# Patient Record
Sex: FEMALE | Race: BLACK OR AFRICAN AMERICAN | NOT HISPANIC OR LATINO | URBAN - METROPOLITAN AREA
[De-identification: names, ages, dates, MRNs, and addresses within clinical notes are randomized per-mention and may not be internally consistent; named-entity substitution may affect disease eponyms.]

---

## 2017-12-11 ENCOUNTER — OFFICE VISIT (OUTPATIENT)
Dept: OPHTHALMOLOGY | Facility: CLINIC | Age: 82
End: 2017-12-11
Payer: COMMERCIAL

## 2017-12-11 DIAGNOSIS — Z13.5 GLAUCOMA SCREENING: Primary | ICD-10-CM

## 2017-12-11 DIAGNOSIS — H25.13 CATARACT, NUCLEAR SCLEROTIC SENILE, BILATERAL: ICD-10-CM

## 2017-12-11 DIAGNOSIS — H25.013 CATARACT CORTICAL, SENILE, BILATERAL: ICD-10-CM

## 2017-12-11 DIAGNOSIS — H52.7 REFRACTIVE ERROR: ICD-10-CM

## 2017-12-11 PROCEDURE — 92004 COMPRE OPH EXAM NEW PT 1/>: CPT | Mod: S$GLB,,, | Performed by: OPTOMETRIST

## 2017-12-11 PROCEDURE — 99999 PR PBB SHADOW E&M-NEW PATIENT-LVL I: CPT | Mod: PBBFAC,,, | Performed by: OPTOMETRIST

## 2017-12-11 PROCEDURE — 92015 DETERMINE REFRACTIVE STATE: CPT | Mod: S$GLB,,, | Performed by: OPTOMETRIST

## 2017-12-11 PROCEDURE — 99201 *HC E&M-NEW PATIENT-LVL I: CPT | Mod: PBBFAC,PO | Performed by: OPTOMETRIST

## 2017-12-11 RX ORDER — BENAZEPRIL/HYDROCHLOROTHIAZIDE 20 MG-25MG
1 TABLET ORAL
COMMUNITY
Start: 2017-12-06

## 2017-12-11 RX ORDER — AMLODIPINE BESYLATE 5 MG/1
5 TABLET ORAL
COMMUNITY
Start: 2017-12-06

## 2017-12-11 NOTE — PROGRESS NOTES
HPI     No visual complaints. Glare when watching television. New patient last   eye exam 4 years. Update glasses RX.    Last edited by Isi Rushing on 12/11/2017 10:15 AM. (History)            Assessment /Plan     For exam results, see Encounter Report.    Cataract, nuclear sclerotic senile, bilateral    Cataract cortical, senile, bilateral    Glaucoma screening    Refractive error      Moderate NS/cortical cataracts OU = discussed and will follow.  OH OK OU otherwise.  Spec Rx given.  RTC one year or prn.